# Patient Record
Sex: MALE | Race: WHITE | NOT HISPANIC OR LATINO | Employment: FULL TIME | URBAN - METROPOLITAN AREA
[De-identification: names, ages, dates, MRNs, and addresses within clinical notes are randomized per-mention and may not be internally consistent; named-entity substitution may affect disease eponyms.]

---

## 2024-02-14 ENCOUNTER — OFFICE VISIT (OUTPATIENT)
Dept: PRIMARY CARE | Facility: CLINIC | Age: 49
End: 2024-02-14
Payer: COMMERCIAL

## 2024-02-14 ENCOUNTER — LAB (OUTPATIENT)
Dept: LAB | Facility: LAB | Age: 49
End: 2024-02-14
Payer: COMMERCIAL

## 2024-02-14 VITALS
SYSTOLIC BLOOD PRESSURE: 122 MMHG | HEART RATE: 76 BPM | BODY MASS INDEX: 32.34 KG/M2 | HEIGHT: 74 IN | DIASTOLIC BLOOD PRESSURE: 92 MMHG | WEIGHT: 252 LBS

## 2024-02-14 DIAGNOSIS — Z23 ENCOUNTER FOR IMMUNIZATION: ICD-10-CM

## 2024-02-14 DIAGNOSIS — E78.5 DYSLIPIDEMIA: ICD-10-CM

## 2024-02-14 DIAGNOSIS — F17.210 CIGARETTE NICOTINE DEPENDENCE WITHOUT COMPLICATION: ICD-10-CM

## 2024-02-14 DIAGNOSIS — M79.642 BILATERAL HAND PAIN: ICD-10-CM

## 2024-02-14 DIAGNOSIS — R06.02 SHORTNESS OF BREATH: ICD-10-CM

## 2024-02-14 DIAGNOSIS — L50.9 URTICARIAL RASH: ICD-10-CM

## 2024-02-14 DIAGNOSIS — R06.83 SNORING: ICD-10-CM

## 2024-02-14 DIAGNOSIS — R12 HEARTBURN: ICD-10-CM

## 2024-02-14 DIAGNOSIS — M10.9 GOUT, UNSPECIFIED CAUSE, UNSPECIFIED CHRONICITY, UNSPECIFIED SITE: ICD-10-CM

## 2024-02-14 DIAGNOSIS — Z12.11 ENCOUNTER FOR SCREENING FOR MALIGNANT NEOPLASM OF COLON: ICD-10-CM

## 2024-02-14 DIAGNOSIS — Z23 NEED FOR INFLUENZA VACCINATION: ICD-10-CM

## 2024-02-14 DIAGNOSIS — I10 PRIMARY HYPERTENSION: Primary | ICD-10-CM

## 2024-02-14 DIAGNOSIS — Z00.00 ROUTINE GENERAL MEDICAL EXAMINATION AT A HEALTH CARE FACILITY: ICD-10-CM

## 2024-02-14 DIAGNOSIS — E66.09 CLASS 1 OBESITY DUE TO EXCESS CALORIES WITH SERIOUS COMORBIDITY AND BODY MASS INDEX (BMI) OF 32.0 TO 32.9 IN ADULT: ICD-10-CM

## 2024-02-14 DIAGNOSIS — M79.641 BILATERAL HAND PAIN: ICD-10-CM

## 2024-02-14 PROBLEM — E66.811 CLASS 1 OBESITY DUE TO EXCESS CALORIES WITH SERIOUS COMORBIDITY AND BODY MASS INDEX (BMI) OF 32.0 TO 32.9 IN ADULT: Status: ACTIVE | Noted: 2024-02-14

## 2024-02-14 LAB
ALBUMIN SERPL BCP-MCNC: 4.7 G/DL (ref 3.4–5)
ALP SERPL-CCNC: 74 U/L (ref 33–120)
ALT SERPL W P-5'-P-CCNC: 29 U/L (ref 10–52)
ANION GAP SERPL CALC-SCNC: 13 MMOL/L (ref 10–20)
AST SERPL W P-5'-P-CCNC: 20 U/L (ref 9–39)
BASOPHILS # BLD AUTO: 0.03 X10*3/UL (ref 0–0.1)
BASOPHILS NFR BLD AUTO: 0.4 %
BILIRUB SERPL-MCNC: 0.7 MG/DL (ref 0–1.2)
BUN SERPL-MCNC: 16 MG/DL (ref 6–23)
CALCIUM SERPL-MCNC: 9.5 MG/DL (ref 8.6–10.3)
CHLORIDE SERPL-SCNC: 102 MMOL/L (ref 98–107)
CHOLEST SERPL-MCNC: 311 MG/DL (ref 0–199)
CHOLESTEROL/HDL RATIO: 7.2
CO2 SERPL-SCNC: 28 MMOL/L (ref 21–32)
CREAT SERPL-MCNC: 1.03 MG/DL (ref 0.5–1.3)
EGFRCR SERPLBLD CKD-EPI 2021: 90 ML/MIN/1.73M*2
EOSINOPHIL # BLD AUTO: 0.13 X10*3/UL (ref 0–0.7)
EOSINOPHIL NFR BLD AUTO: 1.8 %
ERYTHROCYTE [DISTWIDTH] IN BLOOD BY AUTOMATED COUNT: 12.1 % (ref 11.5–14.5)
GLUCOSE SERPL-MCNC: 87 MG/DL (ref 74–99)
HCT VFR BLD AUTO: 49.3 % (ref 41–52)
HDLC SERPL-MCNC: 43 MG/DL
HGB BLD-MCNC: 16.4 G/DL (ref 13.5–17.5)
IMM GRANULOCYTES # BLD AUTO: 0.03 X10*3/UL (ref 0–0.7)
IMM GRANULOCYTES NFR BLD AUTO: 0.4 % (ref 0–0.9)
LDLC SERPL CALC-MCNC: ABNORMAL MG/DL
LYMPHOCYTES # BLD AUTO: 2.22 X10*3/UL (ref 1.2–4.8)
LYMPHOCYTES NFR BLD AUTO: 30.2 %
MCH RBC QN AUTO: 30.5 PG (ref 26–34)
MCHC RBC AUTO-ENTMCNC: 33.3 G/DL (ref 32–36)
MCV RBC AUTO: 92 FL (ref 80–100)
MONOCYTES # BLD AUTO: 0.5 X10*3/UL (ref 0.1–1)
MONOCYTES NFR BLD AUTO: 6.8 %
NEUTROPHILS # BLD AUTO: 4.45 X10*3/UL (ref 1.2–7.7)
NEUTROPHILS NFR BLD AUTO: 60.4 %
NON HDL CHOLESTEROL: 268 MG/DL (ref 0–149)
NRBC BLD-RTO: 0 /100 WBCS (ref 0–0)
PLATELET # BLD AUTO: 224 X10*3/UL (ref 150–450)
POTASSIUM SERPL-SCNC: 4.2 MMOL/L (ref 3.5–5.3)
PROT SERPL-MCNC: 7.9 G/DL (ref 6.4–8.2)
RBC # BLD AUTO: 5.37 X10*6/UL (ref 4.5–5.9)
SODIUM SERPL-SCNC: 139 MMOL/L (ref 136–145)
TRIGL SERPL-MCNC: 472 MG/DL (ref 0–149)
URATE SERPL-MCNC: 7.9 MG/DL (ref 4–7.5)
VLDL: ABNORMAL
WBC # BLD AUTO: 7.4 X10*3/UL (ref 4.4–11.3)

## 2024-02-14 PROCEDURE — 90677 PCV20 VACCINE IM: CPT | Performed by: STUDENT IN AN ORGANIZED HEALTH CARE EDUCATION/TRAINING PROGRAM

## 2024-02-14 PROCEDURE — 90686 IIV4 VACC NO PRSV 0.5 ML IM: CPT | Performed by: STUDENT IN AN ORGANIZED HEALTH CARE EDUCATION/TRAINING PROGRAM

## 2024-02-14 PROCEDURE — 3008F BODY MASS INDEX DOCD: CPT | Performed by: STUDENT IN AN ORGANIZED HEALTH CARE EDUCATION/TRAINING PROGRAM

## 2024-02-14 PROCEDURE — 85025 COMPLETE CBC W/AUTO DIFF WBC: CPT

## 2024-02-14 PROCEDURE — 36415 COLL VENOUS BLD VENIPUNCTURE: CPT

## 2024-02-14 PROCEDURE — 90472 IMMUNIZATION ADMIN EACH ADD: CPT | Performed by: STUDENT IN AN ORGANIZED HEALTH CARE EDUCATION/TRAINING PROGRAM

## 2024-02-14 PROCEDURE — 80053 COMPREHEN METABOLIC PANEL: CPT

## 2024-02-14 PROCEDURE — 3074F SYST BP LT 130 MM HG: CPT | Performed by: STUDENT IN AN ORGANIZED HEALTH CARE EDUCATION/TRAINING PROGRAM

## 2024-02-14 PROCEDURE — 3080F DIAST BP >= 90 MM HG: CPT | Performed by: STUDENT IN AN ORGANIZED HEALTH CARE EDUCATION/TRAINING PROGRAM

## 2024-02-14 PROCEDURE — 80061 LIPID PANEL: CPT

## 2024-02-14 PROCEDURE — 90471 IMMUNIZATION ADMIN: CPT | Performed by: STUDENT IN AN ORGANIZED HEALTH CARE EDUCATION/TRAINING PROGRAM

## 2024-02-14 PROCEDURE — 84550 ASSAY OF BLOOD/URIC ACID: CPT

## 2024-02-14 PROCEDURE — 99204 OFFICE O/P NEW MOD 45 MIN: CPT | Performed by: STUDENT IN AN ORGANIZED HEALTH CARE EDUCATION/TRAINING PROGRAM

## 2024-02-14 RX ORDER — LOSARTAN POTASSIUM 50 MG/1
50 TABLET ORAL DAILY
COMMUNITY
Start: 2023-07-16 | End: 2024-02-14 | Stop reason: SDUPTHER

## 2024-02-14 RX ORDER — BUDESONIDE AND FORMOTEROL FUMARATE DIHYDRATE 80; 4.5 UG/1; UG/1
2 AEROSOL RESPIRATORY (INHALATION)
COMMUNITY
Start: 2023-03-29

## 2024-02-14 RX ORDER — BISMUTH SUBSALICYLATE 262 MG
1 TABLET,CHEWABLE ORAL DAILY
COMMUNITY

## 2024-02-14 RX ORDER — LOSARTAN POTASSIUM 50 MG/1
50 TABLET ORAL DAILY
Qty: 90 TABLET | Refills: 3 | Status: SHIPPED | OUTPATIENT
Start: 2024-02-14 | End: 2025-02-13

## 2024-02-14 RX ORDER — ATORVASTATIN CALCIUM 10 MG/1
10 TABLET, FILM COATED ORAL DAILY
COMMUNITY
Start: 2023-03-16 | End: 2024-02-16 | Stop reason: SDUPTHER

## 2024-02-14 RX ORDER — PANTOPRAZOLE SODIUM 40 MG/1
40 TABLET, DELAYED RELEASE ORAL DAILY
COMMUNITY
Start: 2023-03-16 | End: 2024-02-14 | Stop reason: SDUPTHER

## 2024-02-14 RX ORDER — PANTOPRAZOLE SODIUM 40 MG/1
40 TABLET, DELAYED RELEASE ORAL DAILY
Qty: 90 TABLET | Refills: 3 | Status: SHIPPED | OUTPATIENT
Start: 2024-02-14 | End: 2025-02-13

## 2024-02-14 ASSESSMENT — ENCOUNTER SYMPTOMS
FEVER: 0
ABDOMINAL PAIN: 0
HEADACHES: 0
DIARRHEA: 0
DYSPHORIC MOOD: 0
RHINORRHEA: 0
FLANK PAIN: 0
NERVOUS/ANXIOUS: 0
EYE PAIN: 0
ARTHRALGIAS: 1
NUMBNESS: 0
CONSTIPATION: 0
MYALGIAS: 0
WEAKNESS: 0
VOMITING: 0
DIZZINESS: 0
ADENOPATHY: 0
ROS GI COMMENTS: HEARBURN
COUGH: 0
NAUSEA: 0
SHORTNESS OF BREATH: 0
PALPITATIONS: 0
SLEEP DISTURBANCE: 0
BLOOD IN STOOL: 0
DYSURIA: 0
EYE REDNESS: 0
CHILLS: 0

## 2024-02-14 ASSESSMENT — PATIENT HEALTH QUESTIONNAIRE - PHQ9
1. LITTLE INTEREST OR PLEASURE IN DOING THINGS: NOT AT ALL
SUM OF ALL RESPONSES TO PHQ9 QUESTIONS 1 AND 2: 0
2. FEELING DOWN, DEPRESSED OR HOPELESS: NOT AT ALL

## 2024-02-14 NOTE — ASSESSMENT & PLAN NOTE
Able to quit with Chantix in the past. Not yet ready to try again. Encouraged smoking cessation. Patient instructed to follow up if/when he desires pharmacologic assistance. He may be interested in nicotine patch.

## 2024-02-14 NOTE — ASSESSMENT & PLAN NOTE
1 episode in 2019, R knee. Will check uric acid level. He has done well with dietary modification.

## 2024-02-14 NOTE — ASSESSMENT & PLAN NOTE
Discussed role of antihistamines. He is taking Zyrtec as needed and may consider adding Pepcid. Medication dosing and side effects reviewed. Return precautions reviewed.

## 2024-02-14 NOTE — ASSESSMENT & PLAN NOTE
Managed on losartan 50mg every day. BP a little above goal today in office. Will continue on current dose of losartan for now and will consider increasing dose at next appt if no better. Will update labs and will follow up with results. Dietary modifications and recommendation for 150 minutes of moderate-intensity exercise per week reviewed.

## 2024-02-14 NOTE — ASSESSMENT & PLAN NOTE
Reviewed lifestyle modifications - avoidance of food triggers, sit upright for 30min after meals, no large meals 2hrs prior to bed. Will restart PPI as he did well with this tin the past.

## 2024-02-14 NOTE — ASSESSMENT & PLAN NOTE
Previously managed on Lipitor and Vascepa. Has not taken in about 1yr. Will update labs prior to restarting medication(s).

## 2024-02-14 NOTE — PROGRESS NOTES
Subjective   Patient ID: Priyank Weiner is a 49 y.o. male who presents for establish care. Previous provider Yajaira Chanel in [place]. Hasn't taken any meds but losartan in over a year. Needs labs. Moved back to [place] 5/2023.    HPI  HTN - started on losartan about 1.5yrs ago, managed on 50mg every day, tolerates without adverse effects, states BP usually at goal at home when he checks it, averages 120/85-95, doesn't use much salt, active at baseline but no formal exercise, recently gained 15lbs (since moving and starting new job), was working at [employer] which helped, stopped the losartan for some time after moving and then restarted the medication around 9/2023 after he felt himself getting flushed, better now    HLD - previously managed on Lipitor and Vascepa, Lipitor made him dizzy so took at night, ran out about a year ago, these were started around 2020, started Lipitor first then Vascepa added    Heartburn - previously managed on Protonix which helped, tried other otc medications which don't seem to help as much, working on conservative measures yaima avoiding food triggers    SOB - has never done PFTs, managed on Symbicort which helps with SOB and wheezing, smokes 1.5ppd, quit for almost 6mo and started again in 2019, smoked 1.5 x20yrs previously, was able to quit with Chantix, did have some low mood but states tolerated, quitting is on radar, not yet ready    Gout - hx 1 episode in R knee in 2019, due to shellfish per pt report, mindful of diet since then, occasionally supplements with tart cherry    Urticarial rash - with stress historically, taking Zyrtec which helps, may try Pepcid also, mother with pancreatic ca at home with hospice which has flared rash/stress    Bilateral hand pain - chronic, diffuse with occasional swelling and stiffness, worse in [facility] and [place]s, worse after working at [employer], not interested in medication trial    Prostate Cancer Screening: no fhx, asymptomatic, plan to start at age  "52  Colon Cancer Screening: no fhx, asymptomatic, due, prefers Cologuard  Tobacco: 35 pack-yr hx, 1.5ppd currently, not yet ready to quit  Alcohol: 2-3 drinks on average 4-5 days per week, occasionally 10-12 x5yrs, goes days between, no withdrawal symptoms  Recreational Drugs: denies  Immunizations: influenza and [id number] today, otherwise utd    Review of Systems   Constitutional:  Negative for chills and fever.   HENT:  Negative for congestion and rhinorrhea.    Eyes:  Negative for pain and redness.   Respiratory:  Negative for cough and shortness of breath.    Cardiovascular:  Negative for chest pain, palpitations and leg swelling.   Gastrointestinal:  Negative for abdominal pain, blood in stool, constipation, diarrhea, nausea and vomiting.        Hearburn   Endocrine: Negative for cold intolerance and heat intolerance.   Genitourinary:  Negative for dysuria and flank pain.   Musculoskeletal:  Positive for arthralgias (bl hand pain). Negative for myalgias.   Skin:  Positive for rash.   Neurological:  Negative for dizziness, weakness, numbness and headaches.   Hematological:  Negative for adenopathy.   Psychiatric/Behavioral:  Negative for dysphoric mood and sleep disturbance. The patient is not nervous/anxious.      Objective   BP (!) 122/92   Pulse 76   Ht 1.88 m (6' 2\")   Wt 114 kg (252 lb)   BMI 32.35 kg/m²     Physical Exam  Vitals reviewed.   Constitutional:       General: He is not in acute distress.     Appearance: Normal appearance.   HENT:      Head: Normocephalic and atraumatic.   Eyes:      General: No scleral icterus.     Conjunctiva/sclera: Conjunctivae normal.   Cardiovascular:      Rate and Rhythm: Normal rate and regular rhythm.      Heart sounds: No murmur heard.  Pulmonary:      Effort: Pulmonary effort is normal. No respiratory distress.      Breath sounds: Normal breath sounds.   Abdominal:      General: Bowel sounds are normal. There is no distension.      Palpations: Abdomen is soft.     "  Tenderness: There is no abdominal tenderness. There is no guarding or rebound.   Musculoskeletal:         General: No swelling or deformity.      Cervical back: Normal range of motion and neck supple.   Skin:     General: Skin is warm and dry.      Findings: Rash (urticarial rash R lateral upper arm) present.   Neurological:      General: No focal deficit present.      Mental Status: He is alert.   Psychiatric:         Mood and Affect: Mood normal.         Behavior: Behavior normal.       Assessment/Plan   Problem List Items Addressed This Visit             ICD-10-CM    Urticarial rash L50.9     Discussed role of antihistamines. He is taking Zyrtec as needed and may consider adding Pepcid. Medication dosing and side effects reviewed. Return precautions reviewed.          Snoring R06.83     Per pt report. Will consider HST in future to assess for possible underlying ROWENA.         Shortness of breath R06.02     Managed on Symbicort, which seems to help. No clear diagnosis, but discussed likely component of at least mild COPD. Will continue Symbicort for now and will plan to obtain PFTs for diagnosis clarification in the future. Return precautions reviewed.          Primary hypertension - Primary I10     Managed on losartan 50mg every day. BP a little above goal today in office. Will continue on current dose of losartan for now and will consider increasing dose at next appt if no better. Will update labs and will follow up with results. Dietary modifications and recommendation for 150 minutes of moderate-intensity exercise per week reviewed.          Relevant Medications    losartan (Cozaar) 50 mg tablet    Other Relevant Orders    Follow Up In Primary Care - Health Maintenance    Heartburn R12     Reviewed lifestyle modifications - avoidance of food triggers, sit upright for 30min after meals, no large meals 2hrs prior to bed. Will restart PPI as he did well with this tin the past.         Relevant Medications     pantoprazole (ProtoNix) 40 mg EC tablet    Other Relevant Orders    Follow Up In Primary Care - Health Maintenance    Gout M10.9     1 episode in 2019, R knee. Will check uric acid level. He has done well with dietary modification.         Relevant Orders    Uric acid    Follow Up In Primary Care - Health Maintenance    Dyslipidemia E78.5     Previously managed on Lipitor and Vascepa. Has not taken in about 1yr. Will update labs prior to restarting medication(s).         Class 1 obesity due to excess calories with serious comorbidity and body mass index (BMI) of 32.0 to 32.9 in adult E66.09, Z68.32    Cigarette nicotine dependence without complication F17.210     Able to quit with Chantix in the past. Not yet ready to try again. Encouraged smoking cessation. Patient instructed to follow up if/when he desires pharmacologic assistance. He may be interested in nicotine patch.         Bilateral hand pain M79.641, M79.642     Likely component of OA. He is not interested in medication trial. Will continue to monitor.          Other Visit Diagnoses         Codes    Encounter for screening for malignant neoplasm of colon     Z12.11    Relevant Orders    Cologuard® colon cancer screening    Follow Up In Primary Care - Health Maintenance    Routine general medical examination at a health care facility     Z00.00    Relevant Orders    CBC and Auto Differential    Comprehensive Metabolic Panel    Lipid Panel    Follow Up In Primary Care - Health Maintenance    Need for influenza vaccination     Z23    Relevant Orders    Flu vaccine (IIV4) age 6 months and greater, preservative free (Completed)    Follow Up In Primary Care - Health Maintenance    Encounter for immunization     Z23    Relevant Orders    Pneumococcal conjugate vaccine, 20-valent (PREVNAR 20) (Completed)    Follow Up In Primary Care - Health Maintenance        Follow up in 3mo for recheck, sooner if needed.

## 2024-02-14 NOTE — ASSESSMENT & PLAN NOTE
Managed on Symbicort, which seems to help. No clear diagnosis, but discussed likely component of at least mild COPD. Will continue Symbicort for now and will plan to obtain PFTs for diagnosis clarification in the future. Return precautions reviewed.

## 2024-02-16 ENCOUNTER — TELEPHONE (OUTPATIENT)
Dept: PRIMARY CARE | Facility: CLINIC | Age: 49
End: 2024-02-16
Payer: COMMERCIAL

## 2024-02-16 DIAGNOSIS — E78.5 DYSLIPIDEMIA: Primary | ICD-10-CM

## 2024-02-16 RX ORDER — ATORVASTATIN CALCIUM 10 MG/1
10 TABLET, FILM COATED ORAL DAILY
Qty: 90 TABLET | Refills: 1 | Status: SHIPPED | OUTPATIENT
Start: 2024-02-16 | End: 2024-05-10 | Stop reason: SDUPTHER

## 2024-02-16 NOTE — TELEPHONE ENCOUNTER
Patient called to discuss labs and refills of medication. States that refills were on hold until labs came back.

## 2024-03-23 LAB — NONINV COLON CA DNA+OCC BLD SCRN STL QL: NEGATIVE

## 2024-03-25 ENCOUNTER — TELEPHONE (OUTPATIENT)
Dept: PRIMARY CARE | Facility: CLINIC | Age: 49
End: 2024-03-25
Payer: COMMERCIAL

## 2024-03-25 NOTE — TELEPHONE ENCOUNTER
----- Message from Emily Rivera DO sent at 3/25/2024 12:04 AM EDT -----  Please let patient know that his Cologuard is negative. This is good for 3yrs. Thank you

## 2024-05-01 ENCOUNTER — OFFICE VISIT (OUTPATIENT)
Dept: PRIMARY CARE | Facility: CLINIC | Age: 49
End: 2024-05-01
Payer: COMMERCIAL

## 2024-05-01 ENCOUNTER — LAB (OUTPATIENT)
Dept: LAB | Facility: LAB | Age: 49
End: 2024-05-01
Payer: COMMERCIAL

## 2024-05-01 VITALS
BODY MASS INDEX: 32.6 KG/M2 | HEART RATE: 84 BPM | WEIGHT: 254 LBS | HEIGHT: 74 IN | SYSTOLIC BLOOD PRESSURE: 122 MMHG | DIASTOLIC BLOOD PRESSURE: 70 MMHG

## 2024-05-01 DIAGNOSIS — E78.5 DYSLIPIDEMIA: ICD-10-CM

## 2024-05-01 DIAGNOSIS — I10 PRIMARY HYPERTENSION: ICD-10-CM

## 2024-05-01 DIAGNOSIS — Z00.00 ROUTINE GENERAL MEDICAL EXAMINATION AT A HEALTH CARE FACILITY: ICD-10-CM

## 2024-05-01 DIAGNOSIS — E78.5 DYSLIPIDEMIA: Primary | ICD-10-CM

## 2024-05-01 DIAGNOSIS — F17.210 CIGARETTE NICOTINE DEPENDENCE WITHOUT COMPLICATION: ICD-10-CM

## 2024-05-01 DIAGNOSIS — M10.9 GOUT, UNSPECIFIED CAUSE, UNSPECIFIED CHRONICITY, UNSPECIFIED SITE: ICD-10-CM

## 2024-05-01 LAB
ALBUMIN SERPL BCP-MCNC: 4.6 G/DL (ref 3.4–5)
ALP SERPL-CCNC: 72 U/L (ref 33–120)
ALT SERPL W P-5'-P-CCNC: 35 U/L (ref 10–52)
ANION GAP SERPL CALC-SCNC: 14 MMOL/L (ref 10–20)
AST SERPL W P-5'-P-CCNC: 28 U/L (ref 9–39)
BILIRUB SERPL-MCNC: 0.6 MG/DL (ref 0–1.2)
BUN SERPL-MCNC: 14 MG/DL (ref 6–23)
CALCIUM SERPL-MCNC: 9.6 MG/DL (ref 8.6–10.3)
CHLORIDE SERPL-SCNC: 103 MMOL/L (ref 98–107)
CHOLEST SERPL-MCNC: 218 MG/DL (ref 0–199)
CHOLESTEROL/HDL RATIO: 5
CO2 SERPL-SCNC: 28 MMOL/L (ref 21–32)
CREAT SERPL-MCNC: 0.87 MG/DL (ref 0.5–1.3)
EGFRCR SERPLBLD CKD-EPI 2021: >90 ML/MIN/1.73M*2
GLUCOSE SERPL-MCNC: 101 MG/DL (ref 74–99)
HDLC SERPL-MCNC: 44 MG/DL
LDLC SERPL CALC-MCNC: 98 MG/DL
NON HDL CHOLESTEROL: 174 MG/DL (ref 0–149)
POTASSIUM SERPL-SCNC: 3.9 MMOL/L (ref 3.5–5.3)
PROT SERPL-MCNC: 7.6 G/DL (ref 6.4–8.2)
SODIUM SERPL-SCNC: 141 MMOL/L (ref 136–145)
TRIGL SERPL-MCNC: 379 MG/DL (ref 0–149)
VLDL: 76 MG/DL (ref 0–40)

## 2024-05-01 PROCEDURE — 99214 OFFICE O/P EST MOD 30 MIN: CPT | Performed by: STUDENT IN AN ORGANIZED HEALTH CARE EDUCATION/TRAINING PROGRAM

## 2024-05-01 PROCEDURE — 3008F BODY MASS INDEX DOCD: CPT | Performed by: STUDENT IN AN ORGANIZED HEALTH CARE EDUCATION/TRAINING PROGRAM

## 2024-05-01 PROCEDURE — 36415 COLL VENOUS BLD VENIPUNCTURE: CPT

## 2024-05-01 PROCEDURE — 3078F DIAST BP <80 MM HG: CPT | Performed by: STUDENT IN AN ORGANIZED HEALTH CARE EDUCATION/TRAINING PROGRAM

## 2024-05-01 PROCEDURE — 80053 COMPREHEN METABOLIC PANEL: CPT

## 2024-05-01 PROCEDURE — 80061 LIPID PANEL: CPT

## 2024-05-01 PROCEDURE — 3074F SYST BP LT 130 MM HG: CPT | Performed by: STUDENT IN AN ORGANIZED HEALTH CARE EDUCATION/TRAINING PROGRAM

## 2024-05-01 ASSESSMENT — ENCOUNTER SYMPTOMS
COUGH: 0
HEADACHES: 0
DYSPHORIC MOOD: 0
FEVER: 0
SHORTNESS OF BREATH: 0
PALPITATIONS: 0
CHILLS: 0
NERVOUS/ANXIOUS: 0

## 2024-05-01 NOTE — ASSESSMENT & PLAN NOTE
Encouraged smoking cessation. He is planning on trying when stress level improves. Will be eligible for ldct at age 52.

## 2024-05-01 NOTE — ASSESSMENT & PLAN NOTE
Doing well with statin. He did repeat FLP/CMP this morning. Will follow up with results when available.

## 2024-05-01 NOTE — PROGRESS NOTES
"Subjective   Patient ID: Priyank Weiner is a 49 y.o. male who presents for 3 month check. Did labs this morning     HPI  HLD - we have resumed statin based on FLP and elevated 10-yr ASCVD risk, he is tolerating without adverse effects, did repeat labs this morning, results not yet available    Urticarial rash - resolved, he attributes to stress, he is caring for ill mother who he states is unfortunately transitioning in her end of life care    Gout - uric acid mildly elevated at 7.9, states that it was as high as >9 last time it was checked, he tries to be mindful of his diet, is supplementing with tart cherry when he eats shellfish    Prostate Cancer Screening: no fhx, asymptomatic, plan to start at age 52  Colon Cancer Screening: Cologuard neg 3/15/2024, due 3/2027  Tobacco: 35 pack-yr hx, 1.5ppd currently, not yet ready to quit  Alcohol: 2-3 drinks on average 4-5 days per week, occasionally 10-12 x5yrs, goes days between, no withdrawal symptoms  Recreational Drugs: denies  Immunizations: utd    Review of Systems   Constitutional:  Negative for chills and fever.   Respiratory:  Negative for cough and shortness of breath.    Cardiovascular:  Negative for chest pain and palpitations.   Neurological:  Negative for headaches.   Psychiatric/Behavioral:  Negative for dysphoric mood. The patient is not nervous/anxious.    All other systems reviewed and are negative.    Objective   /70   Pulse 84   Ht 1.88 m (6' 2\")   Wt 115 kg (254 lb)   BMI 32.61 kg/m²     Physical Exam  Constitutional:       Appearance: Normal appearance.   HENT:      Head: Normocephalic and atraumatic.   Eyes:      General: No scleral icterus.     Conjunctiva/sclera: Conjunctivae normal.   Cardiovascular:      Rate and Rhythm: Normal rate and regular rhythm.      Heart sounds: No murmur heard.  Pulmonary:      Effort: Pulmonary effort is normal. No respiratory distress.      Breath sounds: Normal breath sounds.   Musculoskeletal:         " General: Normal range of motion.      Cervical back: Normal range of motion and neck supple.   Skin:     General: Skin is warm and dry.      Findings: No rash.   Neurological:      General: No focal deficit present.      Mental Status: He is alert.   Psychiatric:         Mood and Affect: Mood normal.         Behavior: Behavior normal.       Assessment/Plan   Problem List Items Addressed This Visit             ICD-10-CM    Primary hypertension I10     BP now at goal. No changes today. Continue losartan at current dose.         Relevant Orders    Follow Up In Primary Care - Health Maintenance    Gout M10.9     Asymptomatic. We reviewed dietary modification.         Relevant Orders    Follow Up In Primary Care - Health Maintenance    Dyslipidemia - Primary E78.5     Doing well with statin. He did repeat FLP/CMP this morning. Will follow up with results when available.         Relevant Orders    Follow Up In Primary Care - Health Maintenance    Cigarette nicotine dependence without complication F17.210     Encouraged smoking cessation. He is planning on trying when stress level improves. Will be eligible for ldct at age 52.          Other Visit Diagnoses         Codes    Routine general medical examination at a health care facility     Z00.00    Relevant Orders    CBC and Auto Differential    Comprehensive Metabolic Panel    Lipid Panel    Uric acid        Follow up in 1mo for recheck, sooner if needed. Labs prior to appt.

## 2024-05-10 DIAGNOSIS — M10.9 GOUT, UNSPECIFIED CAUSE, UNSPECIFIED CHRONICITY, UNSPECIFIED SITE: Primary | ICD-10-CM

## 2024-05-10 DIAGNOSIS — E78.5 DYSLIPIDEMIA: ICD-10-CM

## 2024-05-10 RX ORDER — PREDNISONE 20 MG/1
40 TABLET ORAL DAILY
Qty: 10 TABLET | Refills: 0 | Status: SHIPPED | OUTPATIENT
Start: 2024-05-10 | End: 2024-11-06

## 2024-05-10 RX ORDER — ATORVASTATIN CALCIUM 20 MG/1
20 TABLET, FILM COATED ORAL DAILY
Qty: 90 TABLET | Refills: 1 | Status: SHIPPED | OUTPATIENT
Start: 2024-05-10 | End: 2025-05-10

## 2024-10-30 DIAGNOSIS — E78.5 DYSLIPIDEMIA: ICD-10-CM

## 2024-10-30 RX ORDER — ATORVASTATIN CALCIUM 20 MG/1
20 TABLET, FILM COATED ORAL DAILY
Qty: 90 TABLET | Refills: 1 | Status: SHIPPED | OUTPATIENT
Start: 2024-10-30 | End: 2025-10-30

## 2025-02-10 DIAGNOSIS — I10 PRIMARY HYPERTENSION: ICD-10-CM

## 2025-02-10 DIAGNOSIS — R12 HEARTBURN: ICD-10-CM

## 2025-02-10 RX ORDER — LOSARTAN POTASSIUM 50 MG/1
50 TABLET ORAL DAILY
Qty: 90 TABLET | Refills: 0 | Status: SHIPPED | OUTPATIENT
Start: 2025-02-10 | End: 2026-02-10

## 2025-02-10 RX ORDER — PANTOPRAZOLE SODIUM 40 MG/1
40 TABLET, DELAYED RELEASE ORAL DAILY
Qty: 90 TABLET | Refills: 0 | Status: SHIPPED | OUTPATIENT
Start: 2025-02-10 | End: 2026-02-10

## 2025-04-30 ENCOUNTER — APPOINTMENT (OUTPATIENT)
Dept: PRIMARY CARE | Facility: CLINIC | Age: 50
End: 2025-04-30
Payer: COMMERCIAL

## 2025-06-23 DIAGNOSIS — I10 PRIMARY HYPERTENSION: ICD-10-CM

## 2025-06-23 DIAGNOSIS — R12 HEARTBURN: ICD-10-CM

## 2025-06-23 DIAGNOSIS — E78.5 DYSLIPIDEMIA: ICD-10-CM

## 2025-06-23 DIAGNOSIS — R06.02 SHORTNESS OF BREATH: Primary | ICD-10-CM

## 2025-06-23 RX ORDER — ATORVASTATIN CALCIUM 20 MG/1
20 TABLET, FILM COATED ORAL DAILY
Qty: 90 TABLET | Refills: 0 | Status: SHIPPED | OUTPATIENT
Start: 2025-06-23 | End: 2026-06-23

## 2025-06-23 RX ORDER — BUDESONIDE AND FORMOTEROL FUMARATE DIHYDRATE 80; 4.5 UG/1; UG/1
2 AEROSOL RESPIRATORY (INHALATION)
Qty: 30.6 G | Refills: 0 | Status: SHIPPED | OUTPATIENT
Start: 2025-06-23

## 2025-06-23 RX ORDER — LOSARTAN POTASSIUM 50 MG/1
50 TABLET ORAL DAILY
Qty: 90 TABLET | Refills: 0 | Status: SHIPPED | OUTPATIENT
Start: 2025-06-23 | End: 2026-06-23

## 2025-06-23 RX ORDER — PANTOPRAZOLE SODIUM 40 MG/1
40 TABLET, DELAYED RELEASE ORAL DAILY
Qty: 90 TABLET | Refills: 0 | Status: SHIPPED | OUTPATIENT
Start: 2025-06-23 | End: 2026-06-23

## 2025-07-16 ENCOUNTER — TELEPHONE (OUTPATIENT)
Dept: PRIMARY CARE | Facility: CLINIC | Age: 50
End: 2025-07-16

## 2025-07-16 DIAGNOSIS — I10 PRIMARY HYPERTENSION: Primary | ICD-10-CM

## 2025-07-16 DIAGNOSIS — E78.5 DYSLIPIDEMIA: ICD-10-CM

## 2025-07-16 DIAGNOSIS — M10.9 GOUT, UNSPECIFIED CAUSE, UNSPECIFIED CHRONICITY, UNSPECIFIED SITE: ICD-10-CM

## 2025-07-16 ASSESSMENT — PROMIS GLOBAL HEALTH SCALE
RATE_AVERAGE_PAIN: 3
CARRYOUT_PHYSICAL_ACTIVITIES: MOSTLY
RATE_MENTAL_HEALTH: VERY GOOD
CARRYOUT_SOCIAL_ACTIVITIES: EXCELLENT
RATE_QUALITY_OF_LIFE: VERY GOOD
RATE_PHYSICAL_HEALTH: GOOD
EMOTIONAL_PROBLEMS: NEVER
RATE_AVERAGE_FATIGUE: MILD
RATE_SOCIAL_SATISFACTION: VERY GOOD
RATE_GENERAL_HEALTH: GOOD

## 2025-07-23 ENCOUNTER — APPOINTMENT (OUTPATIENT)
Dept: PRIMARY CARE | Facility: CLINIC | Age: 50
End: 2025-07-23
Payer: COMMERCIAL

## 2025-07-23 ENCOUNTER — HOSPITAL ENCOUNTER (OUTPATIENT)
Dept: RADIOLOGY | Facility: CLINIC | Age: 50
Discharge: HOME | End: 2025-07-23
Payer: COMMERCIAL

## 2025-07-23 VITALS
WEIGHT: 230 LBS | HEART RATE: 68 BPM | SYSTOLIC BLOOD PRESSURE: 136 MMHG | BODY MASS INDEX: 29.52 KG/M2 | HEIGHT: 74 IN | DIASTOLIC BLOOD PRESSURE: 80 MMHG

## 2025-07-23 DIAGNOSIS — G89.29 CHRONIC LEFT SHOULDER PAIN: ICD-10-CM

## 2025-07-23 DIAGNOSIS — M25.512 CHRONIC LEFT SHOULDER PAIN: ICD-10-CM

## 2025-07-23 DIAGNOSIS — M10.9 GOUT, UNSPECIFIED CAUSE, UNSPECIFIED CHRONICITY, UNSPECIFIED SITE: ICD-10-CM

## 2025-07-23 DIAGNOSIS — M25.511 CHRONIC RIGHT SHOULDER PAIN: ICD-10-CM

## 2025-07-23 DIAGNOSIS — M25.521 PAIN OF BOTH ELBOWS: ICD-10-CM

## 2025-07-23 DIAGNOSIS — M25.522 PAIN OF BOTH ELBOWS: ICD-10-CM

## 2025-07-23 DIAGNOSIS — M79.641 BILATERAL HAND PAIN: ICD-10-CM

## 2025-07-23 DIAGNOSIS — M79.642 BILATERAL HAND PAIN: ICD-10-CM

## 2025-07-23 DIAGNOSIS — G89.29 CHRONIC RIGHT SHOULDER PAIN: ICD-10-CM

## 2025-07-23 DIAGNOSIS — K21.9 GASTROESOPHAGEAL REFLUX DISEASE, UNSPECIFIED WHETHER ESOPHAGITIS PRESENT: ICD-10-CM

## 2025-07-23 DIAGNOSIS — F17.210 CIGARETTE NICOTINE DEPENDENCE WITHOUT COMPLICATION: ICD-10-CM

## 2025-07-23 DIAGNOSIS — I10 PRIMARY HYPERTENSION: Primary | ICD-10-CM

## 2025-07-23 DIAGNOSIS — E78.5 DYSLIPIDEMIA: ICD-10-CM

## 2025-07-23 PROBLEM — E66.09 CLASS 1 OBESITY DUE TO EXCESS CALORIES WITH SERIOUS COMORBIDITY AND BODY MASS INDEX (BMI) OF 32.0 TO 32.9 IN ADULT: Status: RESOLVED | Noted: 2024-02-14 | Resolved: 2025-07-23

## 2025-07-23 PROBLEM — L50.9 URTICARIAL RASH: Status: RESOLVED | Noted: 2024-02-14 | Resolved: 2025-07-23

## 2025-07-23 PROBLEM — E66.811 CLASS 1 OBESITY DUE TO EXCESS CALORIES WITH SERIOUS COMORBIDITY AND BODY MASS INDEX (BMI) OF 32.0 TO 32.9 IN ADULT: Status: RESOLVED | Noted: 2024-02-14 | Resolved: 2025-07-23

## 2025-07-23 PROCEDURE — 73030 X-RAY EXAM OF SHOULDER: CPT | Mod: 50

## 2025-07-23 PROCEDURE — 73070 X-RAY EXAM OF ELBOW: CPT | Mod: BILATERAL PROCEDURE | Performed by: RADIOLOGY

## 2025-07-23 PROCEDURE — 3075F SYST BP GE 130 - 139MM HG: CPT | Performed by: STUDENT IN AN ORGANIZED HEALTH CARE EDUCATION/TRAINING PROGRAM

## 2025-07-23 PROCEDURE — 99215 OFFICE O/P EST HI 40 MIN: CPT | Performed by: STUDENT IN AN ORGANIZED HEALTH CARE EDUCATION/TRAINING PROGRAM

## 2025-07-23 PROCEDURE — 3079F DIAST BP 80-89 MM HG: CPT | Performed by: STUDENT IN AN ORGANIZED HEALTH CARE EDUCATION/TRAINING PROGRAM

## 2025-07-23 PROCEDURE — 73030 X-RAY EXAM OF SHOULDER: CPT | Mod: BILATERAL PROCEDURE | Performed by: RADIOLOGY

## 2025-07-23 PROCEDURE — 3008F BODY MASS INDEX DOCD: CPT | Performed by: STUDENT IN AN ORGANIZED HEALTH CARE EDUCATION/TRAINING PROGRAM

## 2025-07-23 PROCEDURE — 73070 X-RAY EXAM OF ELBOW: CPT | Mod: 50

## 2025-07-23 RX ORDER — VARENICLINE TARTRATE 0.5 (11)-1
KIT ORAL
Qty: 53 EACH | Refills: 0 | Status: SHIPPED | OUTPATIENT
Start: 2025-07-23

## 2025-07-23 RX ORDER — MELOXICAM 7.5 MG/1
7.5 TABLET ORAL DAILY
Qty: 90 TABLET | Refills: 1 | Status: SHIPPED | OUTPATIENT
Start: 2025-07-23

## 2025-07-23 ASSESSMENT — ENCOUNTER SYMPTOMS
PALPITATIONS: 0
CHILLS: 0
SHORTNESS OF BREATH: 0
COUGH: 0
FEVER: 0
NERVOUS/ANXIOUS: 0
HEADACHES: 0
DYSPHORIC MOOD: 0
ARTHRALGIAS: 1

## 2025-07-23 NOTE — ASSESSMENT & PLAN NOTE
- Suspect overuse injury  - Will be trying meloxicam  - Encouraged him to continue bracing  - We also decided to proceed with PT evaluation

## 2025-07-23 NOTE — ASSESSMENT & PLAN NOTE
- Chronic since his time in the   - Suspect component of post traumatic OA  - [facility]  - We discussed tx options and, using shared decision making, we decided to trial meloxicam  - Medication dosing and side effects reviewed.

## 2025-07-23 NOTE — PROGRESS NOTES
Subjective   Patient ID: Priyank Weiner is a 50 y.o. male who presents for yearly check    HPI  Presenting today for routine follow up. He reports that he is overall feeling well aside from joint pain. He has really been working on lifestyle modifications. He is more active and has really adjusted his diet. Eating less fried and fast foods. Cut out deli meats. Eating more fish and chicken. Has added yoga for exercise. He is ready to quit smoking and states was successful with Chantix in the past so would like to try again. He has not had any recent gout flares. Regarding joint pain, localized mainly to hands, shoulders, and elbows. 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 He states that he has fractures in all fingers in the past. Since his time in the , he has struggled with diffuse pain in the hands which limits his daily activities. He has tried NSAID and has been doing exercises to help. [facility] and [facility] is not available. Notes pain and decreased mobility in the R shoulder chronically. Pain is diffuse and worse with ROM. Similar pain is starting in the L shoulder, but still has full ROM. Both elbows started becoming painful about a year ago. Pain is localized to medial aspects of elbows bilaterally. A little better with compression.    Prostate Cancer Screening: plan to start at age 52  Colon Cancer Screening: Ping neg 3/15/2024, due 3/2027  Tobacco: 35 pack-yr hx, 1.5ppd, ready to quit  Alcohol: on average 4-6 beers 5d/wk  Recreational Drugs: denies  Immunizations: utd    Review of Systems   Constitutional:  Negative for chills and fever.   Respiratory:  Negative for cough and shortness of breath.    Cardiovascular:  Negative for chest pain and palpitations.   Musculoskeletal:  Positive for arthralgias.   Neurological:  Negative for headaches.   Psychiatric/Behavioral:  Negative for dysphoric mood. The patient is not  "nervous/anxious.    All other systems reviewed and are negative.    Objective   /80   Pulse 68   Ht 1.88 m (6' 2\")   Wt 104 kg (230 lb)   BMI 29.53 kg/m²     Physical Exam  Constitutional:       Appearance: Normal appearance.   HENT:      Head: Normocephalic and atraumatic.     Eyes:      General: No scleral icterus.     Conjunctiva/sclera: Conjunctivae normal.       Cardiovascular:      Rate and Rhythm: Normal rate and regular rhythm.   Pulmonary:      Effort: Pulmonary effort is normal. No respiratory distress.      Breath sounds: Normal breath sounds.     Musculoskeletal:      Right shoulder: Tenderness (bicep tendon, GH joint) present. No swelling. Decreased range of motion (all plans due to pain).      Left shoulder: Tenderness (bicep tendon, GH joint) present. Normal range of motion.      Right elbow: No swelling or deformity. Decreased range of motion (extension 2/2 pain). Tenderness present in medial epicondyle.      Left elbow: No swelling or deformity. Decreased range of motion (extension 2/2 pain). Tenderness present in medial epicondyle.      Cervical back: Normal range of motion and neck supple.     Skin:     General: Skin is warm and dry.      Findings: No rash.     Neurological:      General: No focal deficit present.      Mental Status: He is alert.     Psychiatric:         Mood and Affect: Mood normal.         Behavior: Behavior normal.       Assessment/Plan   Problem List Items Addressed This Visit           ICD-10-CM    Primary hypertension - Primary I10    - Managed on losartan 50mg every day  - BP acceptable and is expected to improve when he stops smoking  - No changes today         Relevant Orders    Follow Up In Primary Care - Established    Pain of both elbows M25.521, M25.522    - Suspect overuse injury  - Will be trying meloxicam  - Encouraged him to continue bracing  - We also decided to proceed with PT evaluation         Relevant Orders    Follow Up In Primary Care - " Established    Referral to Physical Therapy    XR elbow 1-2 views bilateral    Gout M10.9    - Asymptomatic  - Will follow up with uric acid level when result available         Relevant Orders    Follow Up In Primary Care - Established    GERD (gastroesophageal reflux disease) K21.9    - Managed on Protonix 40mg every day  - Okay to continue PPI         Relevant Orders    Follow Up In Primary Care - Established    Dyslipidemia E78.5    - Managed on Lipitor 20mg every day  - Continue statin  - Did labs this morning; will follow up with results when available         Relevant Orders    Follow Up In Primary Care - Established    Cigarette nicotine dependence without complication F17.210    - He is ready to quit smoking  - Would like to try Chantix as he was successful with this medication in the past  - Medication dosing and side effects reviewed.  - Will be eligible for LDCT at age 52 and AAA screen at age 67         Relevant Medications    varenicline tartrate (Chantix Starting Month Box) 0.5 mg (11)- 1 mg (42) tablet    Other Relevant Orders    Follow Up In Primary Care - Established    Chronic right shoulder pain M25.511, G89.29    - Diffuse pain and decreased ROM in all planes  - We reviewed possible etiologies; I suspect likely combination of OA + rotator cuff pathology  - Will check xr  - Will also be trying meloxicam  - Using shared decision making, we decided to proceed with PT evaluation         Relevant Orders    Follow Up In Primary Care - Established    Referral to Physical Therapy    Chronic left shoulder pain M25.512, G89.29    - Pain with preserved ROM  - Will check xr and will follow up with results  - Suspected OA  - Will be trying meloxicam         Bilateral hand pain M79.641, M79.642    - Chronic since his time in the   - Suspect component of post traumatic OA  - [facility]  - We discussed tx options and, using shared decision making, we decided to trial  meloxicam  - Medication dosing and side effects reviewed.          Relevant Medications    meloxicam (Mobic) 7.5 mg tablet    Other Relevant Orders    Follow Up In Primary Care - Established   Follow up in 6mo for recheck, sooner if needed.   Time Spent  Prep time on day of patient encounter: 1 minutes  Time spent directly with patient, family or caregiver: 27 minutes  Additional Time Spent on Patient Care Activities: 0 minutes  Documentation Time: 12 minutes  Other Time Spent: 0 minutes  Total: 40 minutes

## 2025-07-23 NOTE — ASSESSMENT & PLAN NOTE
- Managed on losartan 50mg every day  - BP acceptable and is expected to improve when he stops smoking  - No changes today

## 2025-07-23 NOTE — ASSESSMENT & PLAN NOTE
- Pain with preserved ROM  - Will check xr and will follow up with results  - Suspected OA  - Will be trying meloxicam

## 2025-07-23 NOTE — ASSESSMENT & PLAN NOTE
- Managed on Lipitor 20mg every day  - Continue statin  - Did labs this morning; will follow up with results when available

## 2025-07-23 NOTE — ASSESSMENT & PLAN NOTE
- He is ready to quit smoking  - Would like to try Chantix as he was successful with this medication in the past  - Medication dosing and side effects reviewed.  - Will be eligible for LDCT at age 52 and AAA screen at age 67

## 2025-07-23 NOTE — ASSESSMENT & PLAN NOTE
- Diffuse pain and decreased ROM in all planes  - We reviewed possible etiologies; I suspect likely combination of OA + rotator cuff pathology  - Will check xr  - Will also be trying meloxicam  - Using shared decision making, we decided to proceed with PT evaluation

## 2025-07-24 ENCOUNTER — RESULTS FOLLOW-UP (OUTPATIENT)
Dept: PRIMARY CARE | Facility: CLINIC | Age: 50
End: 2025-07-24
Payer: COMMERCIAL

## 2025-07-24 LAB
ALBUMIN SERPL-MCNC: 4.9 G/DL (ref 3.6–5.1)
ALP SERPL-CCNC: 66 U/L (ref 36–130)
ALT SERPL-CCNC: 32 U/L (ref 9–46)
ANION GAP SERPL CALCULATED.4IONS-SCNC: 8 MMOL/L (CALC) (ref 7–17)
AST SERPL-CCNC: 23 U/L (ref 10–40)
BASOPHILS # BLD AUTO: 32 CELLS/UL (ref 0–200)
BASOPHILS NFR BLD AUTO: 0.5 %
BILIRUB SERPL-MCNC: 0.6 MG/DL (ref 0.2–1.2)
BUN SERPL-MCNC: 14 MG/DL (ref 7–25)
CALCIUM SERPL-MCNC: 9.3 MG/DL (ref 8.6–10.3)
CHLORIDE SERPL-SCNC: 104 MMOL/L (ref 98–110)
CHOLEST SERPL-MCNC: 146 MG/DL
CHOLEST/HDLC SERPL: 2.9 (CALC)
CO2 SERPL-SCNC: 28 MMOL/L (ref 20–32)
CREAT SERPL-MCNC: 0.95 MG/DL (ref 0.6–1.29)
EGFRCR SERPLBLD CKD-EPI 2021: 99 ML/MIN/1.73M2
EOSINOPHIL # BLD AUTO: 147 CELLS/UL (ref 15–500)
EOSINOPHIL NFR BLD AUTO: 2.3 %
ERYTHROCYTE [DISTWIDTH] IN BLOOD BY AUTOMATED COUNT: 13.1 % (ref 11–15)
GLUCOSE SERPL-MCNC: 101 MG/DL (ref 65–99)
HCT VFR BLD AUTO: 47.7 % (ref 38.5–50)
HDLC SERPL-MCNC: 51 MG/DL
HGB BLD-MCNC: 15.8 G/DL (ref 13.2–17.1)
LDLC SERPL CALC-MCNC: 76 MG/DL (CALC)
LYMPHOCYTES # BLD AUTO: 1709 CELLS/UL (ref 850–3900)
LYMPHOCYTES NFR BLD AUTO: 26.7 %
MCH RBC QN AUTO: 31 PG (ref 27–33)
MCHC RBC AUTO-ENTMCNC: 33.1 G/DL (ref 32–36)
MCV RBC AUTO: 93.5 FL (ref 80–100)
MONOCYTES # BLD AUTO: 557 CELLS/UL (ref 200–950)
MONOCYTES NFR BLD AUTO: 8.7 %
NEUTROPHILS # BLD AUTO: 3955 CELLS/UL (ref 1500–7800)
NEUTROPHILS NFR BLD AUTO: 61.8 %
NONHDLC SERPL-MCNC: 95 MG/DL (CALC)
PLATELET # BLD AUTO: 217 THOUSAND/UL (ref 140–400)
PMV BLD REES-ECKER: 11.4 FL (ref 7.5–12.5)
POTASSIUM SERPL-SCNC: 4.3 MMOL/L (ref 3.5–5.3)
PROT SERPL-MCNC: 7.6 G/DL (ref 6.1–8.1)
RBC # BLD AUTO: 5.1 MILLION/UL (ref 4.2–5.8)
SODIUM SERPL-SCNC: 140 MMOL/L (ref 135–146)
TRIGL SERPL-MCNC: 108 MG/DL
URATE SERPL-MCNC: 7.1 MG/DL (ref 4–8)
WBC # BLD AUTO: 6.4 THOUSAND/UL (ref 3.8–10.8)

## 2025-08-04 ENCOUNTER — EVALUATION (OUTPATIENT)
Dept: PHYSICAL THERAPY | Facility: CLINIC | Age: 50
End: 2025-08-04
Payer: COMMERCIAL

## 2025-08-04 DIAGNOSIS — M25.521 PAIN OF BOTH ELBOWS: ICD-10-CM

## 2025-08-04 DIAGNOSIS — M25.611 DECREASED ROM OF RIGHT SHOULDER: Primary | ICD-10-CM

## 2025-08-04 DIAGNOSIS — M25.522 PAIN OF BOTH ELBOWS: ICD-10-CM

## 2025-08-04 DIAGNOSIS — M25.511 CHRONIC RIGHT SHOULDER PAIN: ICD-10-CM

## 2025-08-04 DIAGNOSIS — G89.29 CHRONIC RIGHT SHOULDER PAIN: ICD-10-CM

## 2025-08-04 PROCEDURE — 97110 THERAPEUTIC EXERCISES: CPT | Mod: GP

## 2025-08-04 PROCEDURE — 97161 PT EVAL LOW COMPLEX 20 MIN: CPT | Mod: GP

## 2025-08-04 ASSESSMENT — ENCOUNTER SYMPTOMS
OCCASIONAL FEELINGS OF UNSTEADINESS: 0
LOSS OF SENSATION IN FEET: 0
DEPRESSION: 0

## 2025-08-04 ASSESSMENT — PAIN DESCRIPTION - DESCRIPTORS
DESCRIPTORS: DULL;SHARP
DESCRIPTORS: DULL;SHARP

## 2025-08-04 ASSESSMENT — PAIN SCALES - GENERAL
PAINLEVEL_OUTOF10: 1
PAINLEVEL_OUTOF10: 1

## 2025-08-04 ASSESSMENT — PAIN - FUNCTIONAL ASSESSMENT: PAIN_FUNCTIONAL_ASSESSMENT: 0-10

## 2025-08-04 NOTE — PROGRESS NOTES
Physical Therapy    Physical Therapy Evaluation    Patient Name: Priyank Weiner  MRN: [id number]  : 1975  Referring Physician: Emily Rivera  Today's Date: 2025  Time Calculation  Start Time: 714  Stop Time: 749  Time Calculation (min): 35 min  PT Evaluation Time Entry  PT Evaluation (Low) Time Entry: 25  PT Therapeutic Procedures Time Entry  Therapeutic Exercise Time Entry: 8           General  Reason for Referral: R shoulder pain, B elbow pain  Referred By: Dr. Rivera  General Comment: Visit    Current Problem  Problem List Items Addressed This Visit           ICD-10-CM       Musculoskeletal and Injuries    Pain of both elbows M25.521, M25.522    Relevant Orders    Follow Up In Physical Therapy    Chronic right shoulder pain M25.511, G89.29    Relevant Orders    Follow Up In Physical Therapy    Decreased ROM of right shoulder - Primary M25.611    Relevant Orders    Follow Up In Physical Therapy          SUBJECTIVE  Subjective   Patient's name and  were confirmed this date.    Patient reports right shoulder pain for long period of time dating back to  with gradual onset without specfic incident and bilateral medial elbow pain that started last year without specific cause. The pt has  history as a marine. The pt reports elbow pain started in the right and moved to the left.  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 The pt notes elbow pain seems to be more with overuse and varies from right to left. The pt notes bilateral hand pain with swelling at times that limits his  strength. The pt notes trying anti-inflammatory with slight decrease in pain.       Prior level of function: independent with all activity     Patient states that their goal is to avoid surgery, reduce pain, and improve ROM with physical therapy intervention.      Precautions  Precautions  STEADI Fall Risk Score (The score of 4 or more indicates an increased risk of  "falling): 0  Precautions Comment: HTN (medically managed)       Pain  Pain Assessment: 0-10  0-10 (Numeric) Pain Score: 1 (Range: 1-8/10)  Pain Type: Chronic pain  Pain Location: Shoulder  Pain Descriptors: Dull, Sharp  Multiple Pain Sites: Two    Barriers to Participation: limited availability with work schedule    OBJECTIVE:  Observation/Posture: forward head, rounded shoulders, flexed posture in seated position.    Upper Extremity Strength:  Date:  8/4/2025   MMT 5/5 max  LEFT RIGHT   Shoulder Flexion 4+ 4*   Shoulder Abduction 4+ 4+*   Shoulder ER 5 4   Shoulder IR 5 5   Elbow Flexion 5 5   Elbow Extension 5 5   *Pain with testing    Shoulder ROM:  Date:  8/4/2025   AROM in Degrees LEFT RIGHT    AROM AROM   Shoulder Flexion 155 137*   Shoulder Abduction 92* 82*   Shoulder ER 65 45*   Functional ER T2 C7   Functional IR T12 L2   Elbow flexion 145 145*   Elbow extension -10 -10   *Pain with testing    Palpation: TTP noted at the anterior right shoulder at the subacromial space, mild TTP noted at the medial epicondyle bilaterally      Special tests:  Date:  8/4/2025   SHOULDER RIGHT LEFT   Hawkin's-Bob + -   Neer Impingement  + +   Empty Can  + -           TREATMENT:  Therapeutic exercise:   Exercise: supine wand flexion, 2x10  Exercise: supine shoulder scaption, 2x10  Exercise: seated shoulder ER with wand, 2x10  Exercise: wrist extensors stretch, 3x30\"    PATIENT EDUCATION:  Access Code: [email or web address]  URL: [email or web address]  Date: 08/04/2025  Prepared by: Twan Elliott    Exercises  - Supine Shoulder Flexion Extension AAROM with Dowel  - 1-2 x daily - 7 x weekly - 2-3 sets - 10 reps  - Supine Shoulder Scaption with Dowel  - 1-2 x daily - 7 x weekly - 2-3 sets - 10 reps  - Seated Shoulder External Rotation AAROM with Dowel  - 1-2 x daily - 7 x weekly - 2-3 sets - 10 reps  - Wrist Extensor Stretch With Elbow Flexed: Progression From Elbow at Side  - 1-1 x daily - 7 x weekly - 1 sets - 3 reps " "- 30\" hold    ASSESSEMENT  Pt is a 50 y.o.  referred for physical therapy by Emily Rivera DO  for right shoulder pain and bilateral medial elbow pain. Pt presents with decreased shoulder ROM, decreased bilateral elbow extension, TTP at the anterior right shoulder and bilateral medial epicondyle of the elbow, shoulder weakness, and postural deficits that limit Lifting, Using arm at Shoulder level, Reaching, and Shoulder elevation. This patient would benefit from a therapy program to restore prior level of function, decrease pain, increase AROM, increase strength and improve posture. Pt tolerated all treatment at this time.     Rehab potential: Good    Plan for next visit: Check pt response to previous treatment. Progress shoulder AROM, forearm/elbow flexibility, and UE strengthening as tolerated.     PLAN  Treatment/Interventions: Education/ Instruction, Manual therapy, Neuromuscular re-education, Cryotherapy, Therapeutic activities, Therapeutic exercises  PT Plan: Skilled PT  PT Frequency:  (2 week follow up then 1 month follow up (adjusted for pt work schedule))  Duration: 12 weeks  Onset Date: 08/04/25    Pt. Is in agreement with PT plan.  Goals:  Active       Right shoulder pain, Goran elbow pain       The pt will adhere to and complete HEP in order to improve functionality and progress toward other goals for physical therapy.         Start:  08/04/25    Expected End:  08/25/25            The pt will improve postural awareness to decrease reoccurrence of impairments.          Start:  08/04/25    Expected End:  08/25/25            The pt will decrease pain at the right shoulder and Goran elbows to 2/10 at worst or lower for improved tolerance to reaching overhead, lifting weight during work/ADLs, and golfing.         Start:  08/04/25    Expected End:  11/04/25            The pt will increase ROM of the left shoulder and elbow to WFL Goran for improved ability to reach overhead, straighten the elbow for lifting " reaching, and wash hair/reach behind the back.         Start:  08/04/25    Expected End:  11/04/25            The pt will improve shoulder and forearm strength to 5/5 without pain for improved tolerance to reaching overhead and completing repetitive activity.        Start:  08/04/25    Expected End:  11/04/25            The pt will improve Quickdash score to at least 10% or lower to demonstrate self-reported improvement in use of Ues for activity.         Start:  08/04/25    Expected End:  11/04/25

## 2025-08-18 ENCOUNTER — APPOINTMENT (OUTPATIENT)
Dept: PHYSICAL THERAPY | Facility: CLINIC | Age: 50
End: 2025-08-18
Payer: COMMERCIAL

## 2025-08-28 DIAGNOSIS — R06.02 SHORTNESS OF BREATH: ICD-10-CM

## 2025-08-28 DIAGNOSIS — R12 HEARTBURN: ICD-10-CM

## 2025-08-28 DIAGNOSIS — I10 PRIMARY HYPERTENSION: ICD-10-CM

## 2025-08-28 DIAGNOSIS — E78.5 DYSLIPIDEMIA: ICD-10-CM

## 2025-09-04 RX ORDER — BUDESONIDE AND FORMOTEROL FUMARATE DIHYDRATE 80; 4.5 UG/1; UG/1
2 AEROSOL RESPIRATORY (INHALATION) 2 TIMES DAILY
Qty: 30.6 G | Refills: 1 | Status: SHIPPED | OUTPATIENT
Start: 2025-09-04

## 2025-09-04 RX ORDER — PANTOPRAZOLE SODIUM 40 MG/1
40 TABLET, DELAYED RELEASE ORAL DAILY
Qty: 90 TABLET | Refills: 1 | Status: SHIPPED | OUTPATIENT
Start: 2025-09-04

## 2025-09-04 RX ORDER — LOSARTAN POTASSIUM 50 MG/1
50 TABLET ORAL DAILY
Qty: 90 TABLET | Refills: 1 | Status: SHIPPED | OUTPATIENT
Start: 2025-09-04

## 2025-09-04 RX ORDER — ATORVASTATIN CALCIUM 20 MG/1
20 TABLET, FILM COATED ORAL DAILY
Qty: 90 TABLET | Refills: 1 | Status: SHIPPED | OUTPATIENT
Start: 2025-09-04

## 2026-01-23 ENCOUNTER — APPOINTMENT (OUTPATIENT)
Dept: PRIMARY CARE | Facility: CLINIC | Age: 51
End: 2026-01-23
Payer: COMMERCIAL